# Patient Record
(demographics unavailable — no encounter records)

---

## 2024-11-18 NOTE — ASSESSMENT
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  He has experienced paroxysmal symptoms in the past but it is unclear whether these were due to seizures or intoxication.  He presented in late April 2024 with confusion.  MR imaging revealed a questionable left posterior medial parietal  infarction.  He was treated with rosuvastatin and aspirin.  The MRI of the brain with and contrast performed at [facility] on May 7, 2024 was reviewed by Dr. Adan Norton who interpreted the previous noncontrast study at [facility].  He felt that the evolving radiographic was most consistent with a cerebral infarction.  At his September 2024 visit, I suggested continuing aspirin and rosuvastatin.  A follow-up MRI of the brain with without contrast and MR angiography was ordered as well as an echocardiogram with bubble study and a prolonged heart rhythm recorder possibly implanted loop recorder.    Since last seen, he fractured his left fibula and was in drug rehab for a month.  He seems much improved.  He was begun on lacosamide 100 mg twice a day for a possible seizure disorder.  He is scheduled to see Dr. Latham soon.  He will follow-up with his psychiatrist Dr. Avila.  Regarding the questionable stroke in spring 2024, he is scheduled for his updated MRI of the brain and MR angiography.  He will continue aspirin and rosuvastatin for cardiovascular prophylaxis.  I referred him for cardiology consultation to assist in arranging an echocardiogram with bubble study and a prolonged heart rhythm recorder.  Further management will depend upon these results and his clinical course.

## 2024-11-18 NOTE — PHYSICAL EXAM
[FreeTextEntry1] : Constitutional:  Patient was very obese but in no acute distress.   Head:  Normocephalic, atraumatic. Tympanic membranes were clear.   Neck:  Supple with full range of motion.   Cardiovascular:  Cardiac rhythm was regular without murmur. There were no carotid bruits. Peripheral pulses were full and symmetric.  The left calf and ankle were swollen.  Respiratory:  Lungs were clear.   Abdomen:  Soft and nontender.   Spine:  Nontender.   Skin:  There were no rashes.   NEUROLOGICAL EXAMINATION:  Mental Status: He was alert and oriented. Speech was fluent. There was no dysarthria.    Cranial Nerves:   II: He could finger count bilaterally. Pupils were equal and reactive. Visual fields were full. Funduscopic examination was normal.   III, IV, VI:  Eye movements were full without nystagmus.   V: Facial sensation was intact.   VII: Facial strength was normal.  There was mild facial masking.  VIII: Hearing was equal.   IX, X: Palatal movement was normal. Phonation was normal.   XI: Sternocleidomastoids and trapezii were normal.   XII: Tongue was midline and movements normal. There was no lingual atrophy or fasciculations.   Motor Examination: Muscle bulk, tone and strength were normal.    Sensory Examination: Vibration, pinprick and joint position sense were intact.  Reflexes: DTRs were 2+ throughout except for the ankles which were 2.   Plantar Responses: Plantar responses were flexor.   Coordination/Cerebellar Function: There was no dysmetria on finger to nose or heel to shin testing.   Gait/Stance: Gait was normal.  Romberg was negative.

## 2024-11-18 NOTE — ASSESSMENT
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  He has experienced paroxysmal symptoms in the past but it is unclear whether these were due to seizures or intoxication.  He presented in late April 2024 with confusion.  MR imaging revealed a questionable left posterior medial parietal  infarction.  He was treated with rosuvastatin and aspirin.  The MRI of the brain with and contrast performed at [facility] on May 7, 2024 was reviewed by Dr. Adan Norton who interpreted the previous noncontrast study at [facility].  He felt that the evolving radiographic was most consistent with a cerebral infarction.  At his September 2024 visit, I suggested continuing aspirin and rosuvastatin.  A follow-up MRI of the brain with without contrast and MR angiography was ordered as well as an echocardiogram with bubble study and a prolonged heart rhythm recorder possibly implanted loop recorder.    Since last seen, he fractured his left fibula and was in drug rehab for a month.  He seems much improved.  He was begun on lacosamide 100 mg twice a day for a possible seizure disorder.  He is scheduled to see Dr. Latham soon.  He will follow-up with his psychiatrist Dr. Avila.  Regarding the questionable stroke in spring 2024, he is scheduled for his updated MRI of the brain and MR angiography.  He will continue aspirin and rosuvastatin for cardiovascular prophylaxis.  I referred him for cardiology consultation to assist in arranging an echocardiogram with bubble study and a prolonged heart rhythm recorder.  Further management will depend upon these results and his clinical course.

## 2024-11-18 NOTE — HISTORY OF PRESENT ILLNESS
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  He had a long history of pedal pain and numbness.  Electrodiagnostic testing performed performed in November 2021 revealed evidence of a distal right deep peroneal mononeuropathy.  There was evidence of a mild chronic right S1 radiculopathy.  There was no electrophysiologic evidence of a sensorimotor polyneuropathy.  MRI of the right foot revealed mild edema of the interosseous musculature between the fourth and fifth metatarsals with mild fatty infiltration.  There was unchanged perineural fibrosis within the second and third web spaces.  MRI of the right ankle revealed nonspecific edema and minimal fatty infiltration within the extensor digitorum brevis muscle which appeared slightly more prominent compared with the previous MRI.  This was felt to be related to subacute denervation or secondary to muscle strain.  There was slight effacement of the fat planes around the deep peroneal nerve along its lateral and plantar margins at the level of the talonavicular joint.  There was mild tendinosis of the Achilles tendon.  MRI of the lumbar spine revealed multilevel spondylosis.  At L3-4, there was moderate canal narrowing with mild to moderate right and mild left neuroforaminal narrowing.  At L4-5, there was mild right foraminal narrowing and mild flattening of the ventral thecal sac.  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 Richard Avila, a psychiatrist.  At his March 2023 visit, he believed that his problem was severe insomnia and not depression and anxiety.  He was taking zaleplon for sleep.  He was also taking Nucynta 100 mg twice a day and pregabalin 150 mg twice a day for pain.  His other medications included Uroxatrol, amlodipine, valsartan, rosuvastatin, aspirin and levetiracetam 500 mg twice a day.  He was in [place] on March 4, 2023 attending his daughter's wedding.  After the reception during which he had 2 alcoholic beverages, he apparently became acutely confused.  He awoke the next day in an empty room still dressed in his tuxedo.  He was acutely confused.  He was taken to [facility].  Toxicology screen was negative.  CT of the brain and CT angiography were unremarkable.  Chest x-ray was normal.  MRI of the brain was normal.  Both routine and prolonged EEGs revealed intermittent generalized slowing.  He was treated with levetiracetam him and discharged.  He is not driving.  He otherwise feels well.  4 years prior, he was sleepwalking and ended up in a neighbor's home.  A month before while walking his dogs, he found himself in a neighbor's car.  He denied paroxysmal symptoms of scarlet vu, dread or smell of burning rubber.  On March 14, 2023, he scored 28 out of 30 on MMSE making 2 errors in recall.  He was on levetiracetam at the time given concern of possible seizures.  I thought he had a severe sleep disorder.  I suggested an epilepsy evaluation.  In May, 2023 he was hospitalized at [facility] for altered mental status.  He believes that this was due to a cold medicine.  He was also administered Haldol and Ativan in the ER due to agitation.  He subsequently discontinued Nucynta and Soma.  An MRI of the brain performed on May 16, 2023 was unremarkable.  CTs of the chest, abdomen and pelvis revealed bladder wall thickening.  He was treated with an antibiotic for presumed urinary tract infection.  At his June 2023 visit, he complained of pain in his back and hip girdle legs and feet.  He was under the care of Dr. David Mireles, a pain management physician in [place] but had not seen him in a year.  He discontinued levetiracetam and was driving.  He denied any alterations in level of consciousness.  He saw Dr. Vincenzo Latham and August 2023.  He felt that his presentation was more consistent with intoxication and seizure.  He underwent a left hip replacement last year.  He received final anesthesia.  He apparently has been experiencing chronic low back pain.  Over the past month, he is experienced episodic shocklike pains in his left more than right anterior thighs.  This occurs with sitting, standing and ambulating.  He was hospitalized in [facility] on April 30, 2024 after presenting with a few days of confusion. Toxicology screen was positive for THC and amphetamines. CT of the brain and CT angiography were unremarkable.  An MRI of the brain revealed abnormal T2 prolongation involving the medial left parietal cortex.  There was a tiny area of restricted diffusion associated with this finding.  The possibility of underlying acute infarct in this region could not be excluded.  He was evaluated in the emergency room by Dr. Ian Haas.  He underwent a contrast-enhanced MRI on May 7, 2024 possibly at [facility] in [place].  He received a voicemail from the physician stating that all was well.  The impression of the May 7, 2024 study was that of a left parietal convexity arachnoid cyst and minimal chronic small vessel disease.  He was treated with aspirin and atorvastatin.  No cardiac evaluation was performed. He was not evaluated after discharge.  At his September 2024 visit, he denied any new neurologic symptoms since last seen.  A few weeks before, he fell down a flight of stairs and sustained an ecchymosis to his right forehead and experienced low back pain.  He recovered.  His imaging was reviewed by Dr. Norton.  He felt that the MRI findings were most consistent with a cerebral infarction.  I suggested continuing aspirin and rosuvastatin.  A repeat MRI of the brain and MR angiography were ordered.  I suggested an echocardiogram with bubble study and a prolonged heart rhythm recorder.  Upon leaving my office in September, he suffered a fall and fracturing his left fibula.  He was home in bed until he was admitted to [facility] on October 7 in a confused state.  He described expressive aphasia.  There was a question of seizures.  He was taking Nucynta which he purchased on the street.  He was begun on lacosamide.  He was sent to a rehabilitation center in [place], [place] for 28 days being discharged 5 days ago.  He is on Suboxone.  His other medications include pregabalin 150 mg 3 times a day, aspirin 81 mg/day, rosuvastatin 40 mg/day, duloxetine 40 mg daily, amlodipine, Ozempic, Uroxatrol.  He was taking Remeron in rehabilitation but gained 40 pounds.  He discontinued it and took Lunesta last night.  He complains of painful shoulders and hands whichshe attributes to tenosynovitis from Remeron.  Past surgical history is notable for bilateral hip replacements, laparotomy 2014 for lysis of adhesions, hernia repair and small bowel resection in 2016.  He suffers of hyperlipidemia, GERD, hypothyroidism, prostatism and arthritis.  There is no history of hypertension, diabetes, cardiac, pulmonary, renal, hepatic, hematologic or cerebrovascular disease.  He has no allergies.  He is  and disabled.  He is a nondrinker and non-smoker.  Family history is notable for coronary artery disease, diabetes, hypertension, cerebellar atrophy, tremor, peripheral neuropathy and restless leg syndrome.  Medications include aspirin 81 mg daily, rosuvastatin 40 mg daily, duloxetine 40 mg daily amlodipine, Ozempic, Uroxatrol, pregabalin 450 mg/day and zaleplon.

## 2024-11-18 NOTE — HISTORY OF PRESENT ILLNESS
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  He had a long history of pedal pain and numbness.  Electrodiagnostic testing performed performed in November 2021 revealed evidence of a distal right deep peroneal mononeuropathy.  There was evidence of a mild chronic right S1 radiculopathy.  There was no electrophysiologic evidence of a sensorimotor polyneuropathy.  MRI of the right foot revealed mild edema of the interosseous musculature between the fourth and fifth metatarsals with mild fatty infiltration.  There was unchanged perineural fibrosis within the second and third web spaces.  MRI of the right ankle revealed nonspecific edema and minimal fatty infiltration within the extensor digitorum brevis muscle which appeared slightly more prominent compared with the previous MRI.  This was felt to be related to subacute denervation or secondary to muscle strain.  There was slight effacement of the fat planes around the deep peroneal nerve along its lateral and plantar margins at the level of the talonavicular joint.  There was mild tendinosis of the Achilles tendon.  MRI of the lumbar spine revealed multilevel spondylosis.  At L3-4, there was moderate canal narrowing with mild to moderate right and mild left neuroforaminal narrowing.  At L4-5, there was mild right foraminal narrowing and mild flattening of the ventral thecal sac.  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 Richard Avila, a psychiatrist.  At his March 2023 visit, he believed that his problem was severe insomnia and not depression and anxiety.  He was taking zaleplon for sleep.  He was also taking Nucynta 100 mg twice a day and pregabalin 150 mg twice a day for pain.  His other medications included Uroxatrol, amlodipine, valsartan, rosuvastatin, aspirin and levetiracetam 500 mg twice a day.  He was in [place] on March 4, 2023 attending his daughter's wedding.  After the reception during which he had 2 alcoholic beverages, he apparently became acutely confused.  He awoke the next day in an empty room still dressed in his tuxedo.  He was acutely confused.  He was taken to [facility].  Toxicology screen was negative.  CT of the brain and CT angiography were unremarkable.  Chest x-ray was normal.  MRI of the brain was normal.  Both routine and prolonged EEGs revealed intermittent generalized slowing.  He was treated with levetiracetam him and discharged.  He is not driving.  He otherwise feels well.  4 years prior, he was sleepwalking and ended up in a neighbor's home.  A month before while walking his dogs, he found himself in a neighbor's car.  He denied paroxysmal symptoms of scarlet vu, dread or smell of burning rubber.  On March 14, 2023, he scored 28 out of 30 on MMSE making 2 errors in recall.  He was on levetiracetam at the time given concern of possible seizures.  I thought he had a severe sleep disorder.  I suggested an epilepsy evaluation.  In May, 2023 he was hospitalized at [facility] for altered mental status.  He believes that this was due to a cold medicine.  He was also administered Haldol and Ativan in the ER due to agitation.  He subsequently discontinued Nucynta and Soma.  An MRI of the brain performed on May 16, 2023 was unremarkable.  CTs of the chest, abdomen and pelvis revealed bladder wall thickening.  He was treated with an antibiotic for presumed urinary tract infection.  At his June 2023 visit, he complained of pain in his back and hip girdle legs and feet.  He was under the care of Dr. David Mireles, a pain management physician in [place] but had not seen him in a year.  He discontinued levetiracetam and was driving.  He denied any alterations in level of consciousness.  He saw Dr. Vincenzo Latham and August 2023.  He felt that his presentation was more consistent with intoxication and seizure.  He underwent a left hip replacement last year.  He received final anesthesia.  He apparently has been experiencing chronic low back pain.  Over the past month, he is experienced episodic shocklike pains in his left more than right anterior thighs.  This occurs with sitting, standing and ambulating.  He was hospitalized in [place] on April 30, 2024 after presenting with a few days of confusion. Toxicology screen was positive for THC and amphetamines. CT of the brain and CT angiography were unremarkable.  An MRI of the brain revealed abnormal T2 prolongation involving the medial left parietal cortex.  There was a tiny area of restricted diffusion associated with this finding.  The possibility of underlying acute infarct in this region could not be excluded.  He was evaluated in the emergency room by Dr. Ian Haas.  He underwent a contrast-enhanced MRI on May 7, 2024 possibly at [facility] in [place].  He received a voicemail from the physician stating that all was well.  The impression of the May 7, 2024 study was that of a left parietal convexity arachnoid cyst and minimal chronic small vessel disease.  He was treated with aspirin and atorvastatin.  No cardiac evaluation was performed. He was not evaluated after discharge.  At his September 2024 visit, he denied any new neurologic symptoms since last seen.  A few weeks before, he fell down a flight of stairs and sustained an ecchymosis to his right forehead and experienced low back pain.  He recovered.  His imaging was reviewed by Dr. Norton.  He felt that the MRI findings were most consistent with a cerebral infarction.  I suggested continuing aspirin and rosuvastatin.  A repeat MRI of the brain and MR angiography were ordered.  I suggested an echocardiogram with bubble study and a prolonged heart rhythm recorder.  Upon leaving my office in September, he suffered a fall and fracturing his left fibula.  He was home in bed until he was admitted to [facility] on October 7 in a confused state.  He described expressive aphasia.  There was a question of seizures.  He was taking Nucynta which he purchased on the street.  He was begun on lacosamide.  He was sent to a rehabilitation center in [place], [place] for 28 days being discharged 5 days ago.  He is on Suboxone.  His other medications include pregabalin 150 mg 3 times a day, aspirin 81 mg/day, rosuvastatin 40 mg/day, duloxetine 40 mg daily, amlodipine, Ozempic, Uroxatrol.  He was taking Remeron in rehabilitation but gained 40 pounds.  He discontinued it and took Lunesta last night.  He complains of painful shoulders and hands whichshe attributes to tenosynovitis from Remeron.  Past surgical history is notable for bilateral hip replacements, laparotomy 2014 for lysis of adhesions, hernia repair and small bowel resection in 2016.  He suffers of hyperlipidemia, GERD, hypothyroidism, prostatism and arthritis.  There is no history of hypertension, diabetes, cardiac, pulmonary, renal, hepatic, hematologic or cerebrovascular disease.  He has no allergies.  He is  and disabled.  He is a nondrinker and non-smoker.  Family history is notable for coronary artery disease, diabetes, hypertension, cerebellar atrophy, tremor, peripheral neuropathy and restless leg syndrome.  Medications include aspirin 81 mg daily, rosuvastatin 40 mg daily, duloxetine 40 mg daily amlodipine, Ozempic, Uroxatrol, pregabalin 450 mg/day and zaleplon.

## 2024-11-18 NOTE — CONSULT LETTER
[Dear  ___] : Dear  [unfilled], [Consult Letter:] : I had the pleasure of evaluating your patient, [unfilled]. [Please see my note below.] : Please see my note below. [Consult Closing:] : Thank you very much for allowing me to participate in the care of this patient.  If you have any questions, please do not hesitate to contact me. [Sincerely,] : Sincerely, [DrAngeles  ___] : Dr. LOFTON [DrAngeles ___] : Dr. LOFTON [FreeTextEntry3] : Mikhail Conte MD\par

## 2024-11-25 NOTE — HISTORY OF PRESENT ILLNESS
[FreeTextEntry1] : 65 yo male presents with complaints of LUTS for many years as well as low T.  He is most bothered by urgency and frequency with slow stream.  He has been alfuzosin fort many years.  He notes post voiding dribbling.  His PSAs have been WNL.  He denies hematuria or dysuria.    PVR = 250 cc (has not voided in 6 hours)  He also notes a hx of low T.  He has been on TRT in the past, but not currently.  His most recent labs are as follows:  7/23/24: T = 172  He does note low libido and fatigue.  ******************************* 11/25/24: He restarted T in August 2024. He also started finasteride. Due for labs today. He feels like urgency has improved on Gemtesa. He still has post-void dribbling, especially at night.   PVR: 10 ml (LUTS)

## 2024-11-25 NOTE — HISTORY OF PRESENT ILLNESS
[FreeTextEntry1] : 67 yo male presents with complaints of LUTS for many years as well as low T.  He is most bothered by urgency and frequency with slow stream.  He has been alfuzosin fort many years.  He notes post voiding dribbling.  His PSAs have been WNL.  He denies hematuria or dysuria.    PVR = 250 cc (has not voided in 6 hours)  He also notes a hx of low T.  He has been on TRT in the past, but not currently.  His most recent labs are as follows:  7/23/24: T = 172  He does note low libido and fatigue.  ******************************* 11/25/24: He restarted T in August 2024. He also started finasteride. Due for labs today. He feels like urgency has improved on Gemtesa. He still has post-void dribbling, especially at night.   PVR: 10 ml (LUTS)

## 2024-11-25 NOTE — ASSESSMENT
[FreeTextEntry1] : 67 yo male with LUTS and low T.   Has had improvement in his LUTS while on the Gemtesa, alfuzosin, and the addition of finasteride. Will continue all medications. He is emptying bladder well.   He is back on TRT. He feels improvement in his symptoms. We will obtain labs today for monitoring.    Plan: 1. PSA, T, estradiol, CBC, CMP, UA, UCx  2. Continue alfuzosin 10 mg daily, finasteride 5 mg daily, and Gemtesa 75 mg daily 3. RTC 3 months

## 2024-11-26 NOTE — ASSESSMENT
[FreeTextEntry1] : Mr. CRUZ presents with similar history as before, episodes of delirium usually in setting of intoxication. The most recent episodes are, however, now associated with report of EEG changes consistent with seizures - but not otherwise specified. Mr. CRUZ is currently taking lacosamide. Mr. CRUZ also notes the onset of myoclonus, usually in UE beginning about a year ago and becoming more frequent and stronger in last few months. Myoclonus is more frequent in the evening or after awakening. It was not present when he was younger.   plan: 1. EMU evaluation to assess myoclonus - determine if epileptic in origin; also to monitor EEG off lacosamide to determine if there are underlying epileptiform changes. 2. continue lacosamide 100 q12 - hold during EMU evaluation.  Will need to weigh pro/con of continuing ASM after EMU eval.  3. will try to obtain EEG report from [facility].    I have spent 30 minutes or longer reviewing patient data or discussing with the patient  the cause of seizures or seizure-like events and comorbid conditions, assessing the risk of recurrence, educating the patient or family to recognize seizures, discussing possible treatment options for seizures and comorbid conditions and documenting encounter and plan. More than 50% of time spent counseling and educating patient about epilepsy including safety issues, AED side effects and interactions, alcohol consumption, sleep deprivation, risks and driving privileges associated with the [facility].

## 2024-11-26 NOTE — HISTORY OF PRESENT ILLNESS
[FreeTextEntry1] : *** 11/26/2024  ***  Lexiglen is referred by himself and Dr. Conte for re-evaluation. He has had recurrent episodes of delirium, most recently in October, again in setting of opiate/medication use/overuse, and then was found in the morning by his son. Described as unresponsive, brought to [facility], where he was delirious for a day or two and reportedly had an inpatient EEG that raised concern for epileptic etiology.  Dr. Whatley note include description of delirium episodes from April 2024 in setting of THC and amphetamine use.  There was at the time finding of T2 signal change in medial left parietal cortex, a subsequent MRI in May 2024 was read as small arachnoid cyst in the medial L parietal region.  He saw Dr. Conte in Sept, and did not report new symptoms.  Beverly experienced a fall shortly after, fracturing L fibula. Repeat MRI raised concern for stroke  *** 08/25/2023  *** Mr. CRUZ is referred by Dr. Conte for 2nd opinion. Dr. Conte's notes reviewed. History as per pt - Mr. CRUZ was in [facility] for wedding of daughter, on way to room became confused, and spent night in another room - took four days to get back to baseline - EEG showed slowing, no seizures. Mr. CRUZ recalls he had small amount of alcohol.  Started on levetiracetam.  Recurred in May, had 2-3 beers - recalls was having more pain - took more medication and became more agitated, and took more medication.  Did not want to get clothed, eventually called ambulance, but was agitated in ED and treated with haloperidol went to [facility].  Since then, has reduced pain medication - Nucynta and Soma (had been taking Nucynta 400-800/day, Soma 1-2g / day - now not taking either).  . Mr. CRUZ recalls that in Feb, he was walking dogs, thought his neighbor's car was his own, confused, neighbor returned wallet and keys.  Similar episode in 2018, sleepwalking - possibly due to Ambien.  Mr. CRUZ reports that he discontinued Nucynta and Soma use and feels much clearer headed, thought has significant pain.  Because he thought symptoms and events likely due to analgesic side effects, he self-discontinued levetiracetam some weeks ago.

## 2024-11-26 NOTE — ASSESSMENT
[FreeTextEntry1] : Mr. CRUZ presents with similar history as before, episodes of delirium usually in setting of intoxication. The most recent episodes are, however, now associated with report of EEG changes consistent with seizures - but not otherwise specified. Mr. CRUZ is currently taking lacosamide. Mr. CRUZ also notes the onset of myoclonus, usually in UE beginning about a year ago and becoming more frequent and stronger in last few months. Myoclonus is more frequent in the evening or after awakening. It was not present when he was younger.   plan: 1. EMU evaluation to assess myoclonus - determine if epileptic in origin; also to monitor EEG off lacosamide to determine if there are underlying epileptiform changes. 2. continue lacosamide 100 q12 - hold during EMU evaluation.  Will need to weigh pro/con of continuing ASM after EMU eval.  3. will try to obtain EEG report from [facility].    I have spent 30 minutes or longer reviewing patient data or discussing with the patient  the cause of seizures or seizure-like events and comorbid conditions, assessing the risk of recurrence, educating the patient or family to recognize seizures, discussing possible treatment options for seizures and comorbid conditions and documenting encounter and plan. More than 50% of time spent counseling and educating patient about epilepsy including safety issues, AED side effects and interactions, alcohol consumption, sleep deprivation, risks and driving privileges associated with the [facility].

## 2024-11-26 NOTE — HISTORY OF PRESENT ILLNESS
[FreeTextEntry1] : *** 11/26/2024  ***  Lexiglen is referred by himself and Dr. Conte for re-evaluation. He has had recurrent episodes of delirium, most recently in October, again in setting of opiate/medication use/overuse, and then was found in the morning by his son. Described as unresponsive, brought to [facility], where he was delirious for a day or two and reportedly had an inpatient EEG that raised concern for epileptic etiology.  Dr. Whatley note include description of delirium episodes from April 2024 in setting of THC and amphetamine use.  There was at the time finding of T2 signal change in medial left parietal cortex, a subsequent MRI in May 2024 was read as small arachnoid cyst in the medial L parietal region.  He saw Dr. Conte in Sept, and did not report new symptoms.  Beverly experienced a fall shortly after, fracturing L fibula. Repeat MRI raised concern for stroke  *** 08/25/2023  *** Mr. CRUZ is referred by Dr. Conte for 2nd opinion. Dr. Conte's notes reviewed. History as per pt - Mr. CRUZ was in [facility] for wedding of daughter, on way to room became confused, and spent night in another room - took four days to get back to baseline - EEG showed slowing, no seizures. Mr. CRUZ recalls he had small amount of alcohol.  Started on levetiracetam.  Recurred in May, had 2-3 beers - recalls was having more pain - took more medication and became more agitated, and took more medication.  Did not want to get clothed, eventually called ambulance, but was agitated in ED and treated with haloperidol went to [facility].  Since then, has reduced pain medication - Nucynta and Soma (had been taking Nucynta 400-800/day, Soma 1-2g / day - now not taking either).  . Mr. CRUZ recalls that in Feb, he was walking dogs, thought his neighbor's car was his own, confused, neighbor returned wallet and keys.  Similar episode in 2018, sleepwalking - possibly due to Ambien.  Mr. CRUZ reports that he discontinued Nucynta and Soma use and feels much clearer headed, thought has significant pain.  Because he thought symptoms and events likely due to analgesic side effects, he self-discontinued levetiracetam some weeks ago.

## 2024-12-10 NOTE — HISTORY OF PRESENT ILLNESS
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 The first episode sounded like a fugue that occurred the night after his daughter's wedding. He went missing overnight and could not recall the events of the night after the wedding (he completely recalled the wedding itself).  He was recently in rehab to get off Nucynta, which was being used/abused for neuropathy. He was there from 10/17 - 11/14/2024.  Had chest pain 18 years ago and underwent invasive coronary angiography at [facility] showing nonobstructive atherosclerotic disease.   He reported low libido since starting Ozempic over a year ago. He lost 35 lbs on Ozempic, but when he went to rehab, he had to stop Adderall, Ozempic, testosterone. He has gained back the weight. While in rehab, he was given Remeron, which he attributes some of the weight gain. Since leaving, he has been off Remeron, but he takes Trazodone PRN.  He has not exercised in several years.

## 2024-12-10 NOTE — DISCUSSION/SUMMARY
[EKG obtained to assist in diagnosis and management of assessed problem(s)] : EKG obtained to assist in diagnosis and management of assessed problem(s) [FreeTextEntry1] :  Echo with bubble study Extended Holter for arrhythmia Evaluation for amyloid as etiology of neuropathy

## 2024-12-10 NOTE — HISTORY OF PRESENT ILLNESS
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 The first episode sounded like a fugue that occurred the night after his daughter's wedding. He went missing overnight and could not recall the events of the night after the wedding (he completely recalled the wedding itself).  He was recently in rehab to get off Nucynta, which was being used/abused for neuropathy. He was there from 10/17 - 11/14/2024.  Had chest pain 18 years ago and underwent invasive coronary angiography at [facility] showing nonobstructive atherosclerotic disease.   He reported low libido since starting Ozempic over a year ago. He lost 35 lbs on Ozempic, but when he went to rehab, he had to stop Adderall, Ozempic, testosterone. He has gained back the weight. While in rehab, he was given Remeron, which he attributes some of the weight gain. Since leaving, he has been off Remeron, but he takes Trazodone PRN.  He has not exercised in several years.

## 2025-01-08 NOTE — PHYSICAL EXAM
[Normal Appearance] : normal appearance [General Appearance - In No Acute Distress] : no acute distress [Heart Rate And Rhythm] : heart rate and rhythm were normal [Abdomen Soft] : soft [Normal Station and Gait] : the gait and station were normal for the patient's age [Skin Color & Pigmentation] : normal skin color and pigmentation [No Focal Deficits] : no focal deficits [Oriented To Time, Place, And Person] : oriented to person, place, and time [Not Anxious] : not anxious [de-identified] : Mild gynecmastia noted L > R

## 2025-01-08 NOTE — HISTORY OF PRESENT ILLNESS
[FreeTextEntry1] : 67 yo male presents with complaints of LUTS for many years as well as low T.  He is most bothered by urgency and frequency with slow stream.  He has been alfuzosin fort many years.  He notes post voiding dribbling.  His PSAs have been WNL.  He denies hematuria or dysuria.    PVR = 250 cc (has not voided in 6 hours)  He also notes a hx of low T.  He has been on TRT in the past, but not currently.  His most recent labs are as follows:  7/23/24: T = 172  He does note low libido and fatigue.  ******************************* 11/25/24: He restarted T in August 2024. He also started finasteride. Due for labs today. He feels like urgency has improved on Gemtesa. He still has post-void dribbling, especially at night.   PVR: 10 ml (LUTS)   ************ 1/7/25Patient returns for follow up.  He continues to take topical testosterone for hypogonadism.  He subjectively feels better on TRT.  He is due for blood work today.  In addition, he notes that he has developed gynecomastia while being on finasteride.  He continues to take Gemtesa and alfuzosin as well for LUTS.  His voiding is improved on this regimen.

## 2025-01-08 NOTE — ASSESSMENT
[FreeTextEntry1] : 67 yo male with hypogonadism on topical T.  Will get T level, CBC, and CMP.  I advised him to stop finasteride as this is likely causing the gynecomastia.  I will refill his Rx for testosterone.

## 2025-01-08 NOTE — PHYSICAL EXAM
[Normal Appearance] : normal appearance [General Appearance - In No Acute Distress] : no acute distress [Heart Rate And Rhythm] : heart rate and rhythm were normal [Abdomen Soft] : soft [Normal Station and Gait] : the gait and station were normal for the patient's age [Skin Color & Pigmentation] : normal skin color and pigmentation [No Focal Deficits] : no focal deficits [Oriented To Time, Place, And Person] : oriented to person, place, and time [Not Anxious] : not anxious [de-identified] : Mild gynecmastia noted L > R

## 2025-01-15 NOTE — REASON FOR VISIT
[Source: ______] : History obtained from [unfilled] [FreeTextEntry3] : Sabas Conte MD [FreeTextEntry1] : January 2025 - Patient returns today for follow-up. His TTE was positive for ASD/PFO with bubbles seen crossing on Valsalva.

## 2025-01-15 NOTE — HISTORY OF PRESENT ILLNESS
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 The first episode sounded like a fugue that occurred the night after his daughter's wedding. He went missing overnight and could not recall the events of the night after the wedding (he completely recalled the wedding itself).  He was recently in rehab to get off Nucynta, which was being used/abused for neuropathy. He was there from 10/17 - 11/14/2024.  Had chest pain 18 years ago and underwent invasive coronary angiography at [facility] showing nonobstructive atherosclerotic disease.   He reported low libido since starting Ozempic over a year ago. He lost 35 lbs on Ozempic, but when he went to rehab, he had to stop Adderall, Ozempic, testosterone. He has gained back the weight. While in rehab, he was given Remeron, which he attributes some of the weight gain. Since leaving, he has been off Remeron, but he takes Trazodone PRN.  He has not exercised in several years.

## 2025-01-15 NOTE — CARDIOLOGY SUMMARY
[de-identified] : 12/9/2024 - sinus rhythm 79 bpm, left atrial enlargement [de-identified] : 1/11/2025 - right to left shunt seen with Valsalva on bubble study Normal LA, normal RV size and function, normal LV systolic function, LVEF 68%

## 2025-01-15 NOTE — HISTORY OF PRESENT ILLNESS
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 The first episode sounded like a fugue that occurred the night after his daughter's wedding. He went missing overnight and could not recall the events of the night after the wedding (he completely recalled the wedding itself).  He was recently in rehab to get off Nucynta, which was being used/abused for neuropathy. He was there from 10/17 - 11/14/2024.  Had chest pain 18 years ago and underwent invasive coronary angiography at [facility] showing nonobstructive atherosclerotic disease.   He reported low libido since starting Ozempic over a year ago. He lost 35 lbs on Ozempic, but when he went to rehab, he had to stop Adderall, Ozempic, testosterone. He has gained back the weight. While in rehab, he was given Remeron, which he attributes some of the weight gain. Since leaving, he has been off Remeron, but he takes Trazodone PRN.  He has not exercised in several years.

## 2025-01-15 NOTE — DISCUSSION/SUMMARY
[EKG obtained to assist in diagnosis and management of assessed problem(s)] : EKG obtained to assist in diagnosis and management of assessed problem(s) [FreeTextEntry1] : Echo with bubble study suggestive of ASD/PFO. Will refer to Jules Cole MD for evaluation for closure.  Evaluation for amyloid as etiology of neuropathy - light chain studies were normal. Follow-up genetic testing for TTR gene (not yet sent). Can consider technetium pyrophosphate scan if there is concern for amyloid cardiomyopathy. None at this time.

## 2025-01-15 NOTE — CARDIOLOGY SUMMARY
[de-identified] : 12/9/2024 - sinus rhythm 79 bpm, left atrial enlargement [de-identified] : 1/11/2025 - right to left shunt seen with Valsalva on bubble study Normal LA, normal RV size and function, normal LV systolic function, LVEF 68%

## 2025-01-23 NOTE — HISTORY OF PRESENT ILLNESS
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 Rachel for four neurologic episodes that occurred over the past two years. The first episode sounded like a fugue that occurred the night after his daughter's wedding. He went missing overnight and could not recall the events of the night after the wedding (he completely recalled the wedding itself). He was recently in rehab to get off Nucynta, which was being used/abused for neuropathy. He was there from 10/17 - 2024. Had chest pain 18 years ago and underwent invasive coronary angiography at [facility] showing nonobstructive atherosclerotic disease. He reported low libido since starting Ozempic over a year ago. He lost 35 lbs on Ozempic, but when he went to rehab, he had to stop Adderall, Ozempic, testosterone. He has gained back the weight. While in rehab, he was given Remeron, which he attributes some of the weight gain. Since leaving, he has been off Remeron, but he takes Trazodone PRN. He has not exercised in several years.  Four episodes of confusion that is felt due to overuse of sleeping medications.  No history of thromboembolic events.  No history of hypercoagulable status. No family history of hypercoagulable state.  No history of thromboembolic event.  He had fibula fracture in  per report negative venous duplex study.  In 2024 he presented with confusion.  MRI revealed a left posterior medial parietal infarction.  MRI on 2024 demonstrated abnormal T2 prolongation involving the medial left parietal cortex.  Diagnosied with arachnoid cyst.  TTE on 2025 demonstrated left ventricular systolic function is normal with an EF 68%.  Normal ventricle cavity size with normal wall thickness and normal right ventricular systolic function.  No significant valvular disease.  No pericardial effusion.  Hypermobile interatrial septum seen with right-to-left shunting seen with Valsalva during bubble study.  Imaging: [id number] - sinus rhythm 79 bpm, left atrial enlargement   Echo: 2025 - right to left shunt seen with Valsalva on bubble study Normal LA, normal RV size and function, normal LV systolic function, LVEF 68%  Assessment/plan PFO ESUS  --Discussion was had with the patient concerning what is a PFO.  The physiology of a PFO was gone over.  The potential pathophysiologic complications of a PFO was reviewed.  The various treatment options for PFO was discussed including medical management (antiplatelet versus anticoagulant), percutaneous closure and surgery. -- The patient is not felt to have had a stroke/thromboembolic event.  He has no personal or family history of hypercoagulable conditions.  No history of arrhythmias.  If the patient is felt to have had a thromboembolic event/stroke it is recommended that he be ruled out for underlying hypercoagulable condition and a 2 to 4-week Holter monitor be placed.  Additionally, it would be recommended that a BABS be performed to assess for high risk features associated with PFO and thromboembolic events. -- Indications and details for PFO closure reviewed.  Benefits and risks were discussed.  Risks include but are not limited to infection, bleeding, arrhythmia, TIA/stroke, hemodynamic instability, vascular injury, need for urgent surgery and death. -- Recommend continue medical management of PFO.  No indication for closure at this time. -- Continue amlodipine 5mg daily. -- Continue aspirin 81mg daily. -- EKG performed history of PFO.  All questions and concerns of the patient were addressed.

## 2025-01-28 NOTE — CONSULT LETTER
[Dear  ___] : Dear  [unfilled], [Consult Letter:] : I had the pleasure of evaluating your patient, [unfilled]. [Please see my note below.] : Please see my note below. [Consult Closing:] : Thank you very much for allowing me to participate in the care of this patient.  If you have any questions, please do not hesitate to contact me. [Sincerely,] : Sincerely, [DrAngeles  ___] : Dr. LOFTON [DrAngeles ___] : Dr. LOFTON [FreeTextEntry3] : Mikhail Conte M.D.

## 2025-01-28 NOTE — PROCEDURE
[FreeTextEntry1] : Nerve conduction studies and electromyography [FreeTextEntry2] : Upper extremity sensory complaints. [FreeTextEntry3] : Electrodiagnostic testing was performed in the upper and lower extremities.  The radial sensory potentials and conduction velocities were normal.  The median sensory potentials were low in amplitude and there was slowing across the wrist segments.  The ulnar sensory potentials were mildly low in amplitude but conduction velocities were normal.  The median motor distal latencies were prolonged but the compound muscle action potentials were normal.  The median conduction velocities were mildly slow.  The ulnar motor distal latencies and compound muscle action potentials were normal.  There was mild slowing of ulnar conduction across the elbow segments.  The median F waves were minimally prolonged.  The ulnar F waves were normal.  The right radial motor distal latency, compound muscle action potential and conduction velocity were normal.  The right sural sensory potential was mildly low in amplitude but conduction velocity was normal.  The left sural sensory nerve was nonreactive.  The tibial H reflexes were normal.  Electromyography was performed in several bilateral upper extremity and cervical paraspinal muscles.  There was no spontaneous activity noted in any muscle tested.  Motor units and recruitment patterns were normal.  Conclusions: This was an abnormal study.  There was electrophysiologic evidence of bilateral median neuropathies in the carpal tunnels.  There were moderate to severe median sensory axon injuries but no motor axon injuries.  There were very mild ulnar neuropathies in the cubital tunnels with mild sensory axon injury.  There was no evidence of right or left cervical radiculopathy.  The abnormal sural sensory potentials raise suspicion of a developing sensory polyneuropathy versus changes associated with normal aging.  Clinical correlation: MRI of the cervical spine revealed no evidence of myelopathy.  A recent MRI of the cervical spine revealed normal cord signal.  There was multilevel foraminal stenosis.  The above findings are highly suggestive that Dr. Paul's hand symptoms are due to carpal tunnel syndrome.  I suggested checking TTR.  Recent immunoglobulin studies were normal.  He will be referred for hand surgical consultation.  Regarding the abnormalities noted on recent MRIs of the brain, the etiology remains uncertain.  An echocardiogram revealed a patent foramen ovale.  He was evaluated by Dr. Tarun Rachel and Dr. Jules Cole regarding possibility of PFO closure.  He remains on aspirin 325 mg/day.  The results of his prolonged heart rhythm recorder are pending.

## 2025-04-16 NOTE — HISTORY OF PRESENT ILLNESS
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 The first episode sounded like a fugue that occurred the night after his daughter's wedding. He went missing overnight and could not recall the events of the night after the wedding (he completely recalled the wedding itself).  He was recently in rehab to get off Nucynta, which was being used/abused for neuropathy. He was there from 10/17 - 11/14/2024.  Had chest pain 18 years ago and underwent invasive coronary angiography at [facility] showing nonobstructive atherosclerotic disease.   He reported low libido since starting Ozempic over a year ago. He lost 35 lbs on Ozempic, but when he went to rehab, he had to stop Adderall, Ozempic, testosterone. He has gained back the weight. While in rehab, he was given Remeron, which he attributes some of the weight gain. Since leaving, he has been off Remeron, but he takes Trazodone PRN.  He has not exercised in several years.    January 2025 - Patient returns today for follow-up. His TTE was positive for ASD/PFO with bubbles seen crossing on Valsalva.

## 2025-04-16 NOTE — DISCUSSION/SUMMARY
[EKG obtained to assist in diagnosis and management of assessed problem(s)] : EKG obtained to assist in diagnosis and management of assessed problem(s) [FreeTextEntry1] : Echo with bubble study suggestive of ASD/PFO. After discussion with Jules Cole MD for evaluation for possible closure, there is no plan for intervention at this time.  Evaluation for amyloid as etiology of neuropathy - light chain studies were normal. Follow-up genetic testing for TTR gene (not yet sent). Can consider technetium pyrophosphate scan if there is concern for amyloid cardiomyopathy. None at this time.

## 2025-04-16 NOTE — CARDIOLOGY SUMMARY
[de-identified] : 12/9/2024 - sinus rhythm 79 bpm, left atrial enlargement [de-identified] : 1/11/2025 - right to left shunt seen with Valsalva on bubble study Normal LA, normal RV size and function, normal LV systolic function, LVEF 68% [de-identified] : 1/28/2025 CTCA - calcium score 0, no atherosclerotic coronary artery disease

## 2025-04-16 NOTE — REASON FOR VISIT
[Source: ______] : History obtained from [unfilled] [FreeTextEntry3] : Sabas Conte MD [FreeTextEntry1] : April 2025 - Patient returns today for follow-up. Imaging does not show evidence of a prior CVA, so in this 67 year-old, there is no plan for closure of PFO.  In the setting of lack of sleep and having missed his regular dose of suboxone, he started to have symptoms again, but they resolved with sleep and resumption of suboxone. He is currently taking suboxone 4 mg BID.

## 2025-04-16 NOTE — CARDIOLOGY SUMMARY
[de-identified] : 12/9/2024 - sinus rhythm 79 bpm, left atrial enlargement [de-identified] : 1/11/2025 - right to left shunt seen with Valsalva on bubble study Normal LA, normal RV size and function, normal LV systolic function, LVEF 68% [de-identified] : 1/28/2025 CTCA - calcium score 0, no atherosclerotic coronary artery disease

## 2025-04-16 NOTE — HISTORY OF PRESENT ILLNESS
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 The first episode sounded like a fugue that occurred the night after his daughter's wedding. He went missing overnight and could not recall the events of the night after the wedding (he completely recalled the wedding itself).  He was recently in rehab to get off Nucynta, which was being used/abused for neuropathy. He was there from 10/17 - 11/14/2024.  Had chest pain 18 years ago and underwent invasive coronary angiography at [facility] showing nonobstructive atherosclerotic disease.   He reported low libido since starting Ozempic over a year ago. He lost 35 lbs on Ozempic, but when he went to rehab, he had to stop Adderall, Ozempic, testosterone. He has gained back the weight. While in rehab, he was given Remeron, which he attributes some of the weight gain. Since leaving, he has been off Remeron, but he takes Trazodone PRN.  He has not exercised in several years.    January 2025 - Patient returns today for follow-up. His TTE was positive for ASD/PFO with bubbles seen crossing on Valsalva.